# Patient Record
Sex: MALE | Race: WHITE | NOT HISPANIC OR LATINO | Employment: OTHER | URBAN - METROPOLITAN AREA
[De-identification: names, ages, dates, MRNs, and addresses within clinical notes are randomized per-mention and may not be internally consistent; named-entity substitution may affect disease eponyms.]

---

## 2018-07-11 NOTE — PATIENT DISCUSSION
"""Annual Type 1 Diabetic eye exam with dilation. Mild nonproliferative diabetic retinopathy found. Macular edema is not present in the right eye. Recommend annual dilated examinations. Patient instructed to call office immediately if sudden changes in vision occur. Emphasized importance of good blood glucose control. Summary of care provided to the physician managing the ongoing diabetes care."" ""Annual Type 1 Diabetic eye exam with dilation. Mild nonproliferative diabetic retinopathy found. Macular edema is not present in the right eye. Recommend annual dilated examinations. Patient instructed to call office immediately if sudden changes in vision occur. Emphasized importance of good blood glucose control. Summary of care provided to the physician managing the ongoing diabetes care. """

## 2022-01-05 NOTE — PATIENT DISCUSSION
"""Type 2 diabetic eye exam with dilation. Mild diabetic retinopathy found. Macular edema is not present in the right eye. Patient instructed to call office immediately if sudden changes in vision occur. Emphasized importance of good blood glucose control. Return in 6 months for dilated fundus exam. Summary of care provided to the physician managing the ongoing diabetes care. ""."

## 2022-04-14 ENCOUNTER — CONSULTATION/EVALUATION (OUTPATIENT)
Dept: URBAN - METROPOLITAN AREA CLINIC 43 | Facility: CLINIC | Age: 67
End: 2022-04-14

## 2022-04-14 DIAGNOSIS — H25.813: ICD-10-CM

## 2022-04-14 PROCEDURE — 92004 COMPRE OPH EXAM NEW PT 1/>: CPT

## 2022-04-14 PROCEDURE — 92025-3 CORNEAL TOPO, REFUSED

## 2022-04-14 PROCEDURE — 92136TC INTERFEROMETRY - TECHNICAL COMPONENT

## 2022-04-14 ASSESSMENT — VISUAL ACUITY
OS_AM: 20/20-1
OD_RAM: 20/20-1
OD_SC: J4
OD_SC: 20/50+2
OS_SC: J1
OD_BAT: 20/60
OS_BAT: 20/200
OS_SC: CF 4FT

## 2022-04-14 ASSESSMENT — TONOMETRY
OD_IOP_MMHG: 15
OS_IOP_MMHG: 16

## 2022-04-14 NOTE — PATIENT DISCUSSION
Discussed monovision. This helps lessen the dependence on glasses, but is a compromise and glasses are often still needed for some activities including driving and binocular vision. Goal would be -1.75 OS ,Aubrie OD.

## 2022-04-14 NOTE — PATIENT DISCUSSION
PT WISHES TO SCHEDULE IN [facility]. PT LEANING TOWARD OS ONLY CUSTOM NEAR GOAL 1.75, BUT WISHES TO THINK ABOUT IT.

## 2022-04-14 NOTE — PATIENT DISCUSSION
Discussed the difficulty of selecting IOL's in previous refractive surgery patients and discussed the possibility of refractive surprise and possible IOL exchange with right eye.

## 2023-04-06 ENCOUNTER — POST-OP (OUTPATIENT)
Dept: URBAN - METROPOLITAN AREA CLINIC 46 | Facility: CLINIC | Age: 68
End: 2023-04-06

## 2023-04-06 DIAGNOSIS — Z96.1: ICD-10-CM

## 2023-04-06 PROCEDURE — 99024 POSTOP FOLLOW-UP VISIT: CPT

## 2023-04-06 ASSESSMENT — VISUAL ACUITY
OD_SC: J10
OD_SC: 20/20-1
OS_SC: 20/20-1
OS_SC: J10

## 2023-04-06 ASSESSMENT — TONOMETRY
OS_IOP_MMHG: 11
OD_IOP_MMHG: 15

## 2023-10-06 ENCOUNTER — TREATMENT (OUTPATIENT)
Dept: PHYSICAL THERAPY | Facility: CLINIC | Age: 68
End: 2023-10-06
Payer: MEDICARE

## 2023-10-06 DIAGNOSIS — M54.2 PAIN, NECK: ICD-10-CM

## 2023-10-06 PROCEDURE — 97110 THERAPEUTIC EXERCISES: CPT | Mod: GP | Performed by: PHYSICAL THERAPIST

## 2023-10-06 PROCEDURE — 97140 MANUAL THERAPY 1/> REGIONS: CPT | Mod: GP,59 | Performed by: PHYSICAL THERAPIST

## 2023-10-06 PROCEDURE — 97161 PT EVAL LOW COMPLEX 20 MIN: CPT | Mod: GP | Performed by: PHYSICAL THERAPIST

## 2023-10-06 PROCEDURE — 97012 MECHANICAL TRACTION THERAPY: CPT | Mod: GP | Performed by: PHYSICAL THERAPIST

## 2023-10-06 NOTE — PROGRESS NOTES
Physical Therapy    Physical Therapy Cervical Spine Evaluation    Patient Name: Alex Dukes  MRN: [id number]  Today's Date: 10/6/2023  Time Calculation  Start Time: 0235  Stop Time: 0340  Time Calculation (min): 65 min    Current Problem  Problem List Items Addressed This Visit             ICD-10-CM    Pain, neck M54.2    Relevant Orders    Follow Up In Physical Therapy         Precautions  Precautions  STEADI Fall Risk Score (The score of 4 or more indicates an increased risk of falling): 0  Precautions Comment: none       Pain  Pain Assessment: 0-10  Pain Score: 3  Pain Type: Chronic pain  Pain Location: Neck  Pain Orientation: Right, Left  Pain Radiating Towards: RUE in median nerve pathway  Pain Descriptors: Aching, Dull, Headache, Numbness, Tingling, Throbbing    SUBJECTIVE:   Chief complaint:  Chronic cervical pain that has been going on for 17-18 years. Has been noticing radicular pain into Right arm as well as all the way down to his first and second fingers, with worsening over summer with playing golf. Does noticing increased pain with excessive use of RUE, such as with golfing. Does get crepitus in cervical spine. Denies any issues with TMJ;  Doesn't have any history of trauma to next recently; Does sleep on side or back and uses pillow with cervical support. Is trying to get new traction unit as he has one from 20 years ago which does not work anymore. Traction has been most helpful treatment in the past.    Pain Better: massage; cupping; traction    Pain Worse: golfing;     Patients goal:  get traction unit    Prior tx:  has history of cervical traction which has helped.     OBJECTIVE:    Posture: mild forward head;     Upper extremity ROM: WNL Unless documented below:  ROM in Degrees RIGHT LEFT   Shoulder Flexion WNL WNL   Shoulder Abduction WNL WNL   Shoulder ER WNL WNL   Shoulder IR WNL WNL   Elbow Flexion     Elbow Extension         Upper Extremity Strength:   RIGHT LEFT   Shoulder Flexion  4-/5 4+/5    Shoulder Abduction  4+/5 4+/5   Shoulder ER 5/5 5/5   Shoulder IR 5/5 5/5   Elbow Flexion 5/5 5/5   Elbow Extension 5/5 5/5     Cervical ROM:  Date: eval Degrees    Flexion 50    Extension 20 *     RIGHT LEFT   Side bend 30 pinching 55   Rotation 35 tightness 40 tightness     Joint mobility: 1-2/6 Right side cervical  Palpation : MTrP's throughout Right scapular musculature, UT, SO, SCM, Scalenes  Neurological symptoms: radicular symptoms into RUE median nerve distribution  Special tests:  Spurling's (+)R>L    TREATMENT:  Mechanical Traction: 20#, 2 step intermittent hold, 8' total (N)  Manual:  -TDN: 60mm to R UT  -STM to SO, R UT, SCM, and Scalenes  -Joint mobilization: Cervical A-P to Right side grade 2-3; Downglides Right side gr 2-3  -PROM Cervical spine rotation B/L  Ther-ex: Pt edu and review of HEP with NAY given and peach, orange, and green theraband given      Access Code: [email or web address]  URL: [email or web address]  Date: 10/06/2023  Prepared by: Soumya Velasco    Exercises  - Supine Cervical Rotation AROM on Pillow  - 1 x daily - 7 x weekly - 1 sets - 10 reps - 5 hold  - Supine Cervical Sidebending  - 1 x daily - 7 x weekly - 1 sets - 10 reps - 5 hold  - Supine Suboccipital Release with Tennis Balls  - 1 x daily - 7 x weekly - 1 sets - 10 reps  - Seated Scapular Retraction  - 1 x daily - 7 x weekly - 1 sets - 10 reps - 3 hold  - Supine Shoulder External Rotation with Resistance  - 1 x daily - 7 x weekly - 2 sets - 10 reps - 5 hold  - Theracane Over Shoulder  - 1 x daily - 7 x weekly - 1 sets - 10 reps  ASSESSMENT  Pt is a 67 y.o.  referred for physical therapy by Huang Antonio MD for neck pain. Pt presents with decreased cervical AROM, as well as decreased joint mobility throughout cervical spine, with neural involvement appearing to be in median nerve pathway. Pt also has significant myofascial restrictions throughout cervical and scapular musculature, which responded well to STM,  Trigger point dry needling, as well as mechanical traction performed this date. . This patient would benefit from a therapy program to restore prior level of function, decrease pain, increase AROM, increase strength and improve posture.    The physical therapy prognosis is good for the patient to achieve their goals.   The pt tolerated therapy treatment today well with no adverse effects.    PLAN  Cryotherapy; Dry needling; Education/ Instruction; Electrical stimulation; Manual therapy; Mechanical traction; Self care/ home management; Therapeutic activities; Therapeutic exercises; IASTM/CUPPING    Medicare certification period: 10/6/2023-01/6/2024     Goals:  Encounter Problems       Encounter Problems (Active)       PT Problem       STG       Start:  10/08/23    Expected End:  10/20/23       Pt will demo and report compliance with HEP in order to augment POC goals and progression toward independence with symptom management.          LTG's       Start:  10/08/23    Expected End:  11/06/23       Pt will demo improved Cervical AROM by  >/= 5 degrees into all restricted ranges for improved ease with functional activities and progression toward independence with ADL's & IADL's.     Pt will report subjective improvement with c/o pain at worst decreased from 7/10 to </= 3/10 with decreased radicular pain by >/= 50% for improved tolerance and ease with ADL's & IADL's.

## 2023-10-06 NOTE — LETTER
October 8, 2023    Soumya Velasco, PT  [address] [facility] [address]    Patient: Alex Dukes   YOB: 1955   Date of Visit: 10/6/2023       Dear Wagner Carlson Md  [address] [place] [address] [place] [address]    The attached plan of care is being sent to you because your patient’s medical reimbursement requires that you certify the plan of care. Your signature is required to allow uninterrupted insurance coverage.      You may indicate your approval by signing below and faxing this form back to us at Dept Fax: [phone].    Please call Dept: [phone] with any questions or concerns.    Thank you for this referral,        Soumya Velasco, PT  [address] [place]  [facility] [address] [phone]    Payer: Payor: MEDICARE / Plan: MEDICARE PART A AND B / Product Type: *No Product type* /                                                                         Date:     Dear Soumya Velasco PT,     Re: Mr. Alex Dukes, MRN:[id number]    I certify that I have reviewed the attached plan of care and it is medically necessary for Mr. Alex Dukes (1955) who is under my care.          ______________________________________                    _________________  Provider name and credentials                                           Date and time                                                                                           Plan of Care 10/6/23   Effective from: 10/6/2023  Effective to: 11/6/2023    Plan ID: [id number]            Participants as of Finalize on 10/8/2023    Name Type Comments Contact Info    Huang Antonio MD PCP - General  [phone]    Soumya Velasco PT Physical Therapist  [phone]       Last Plan Note     Author: Soumya Velasco PT Status: Signed Last edited: 10/6/2023  2:30 PM       Physical Therapy    Physical Therapy Cervical Spine Evaluation    Patient Name: Alex Dukes  MRN:  [id number]  Today's Date: 10/6/2023  Time Calculation  Start Time: 0235  Stop Time: 0340  Time Calculation (min): 65 min    Current Problem  Problem List Items Addressed This Visit             ICD-10-CM    Pain, neck M54.2    Relevant Orders    Follow Up In Physical Therapy         Precautions  Precautions  STEADI Fall Risk Score (The score of 4 or more indicates an increased risk of falling): 0  Precautions Comment: none       Pain  Pain Assessment: 0-10  Pain Score: 3  Pain Type: Chronic pain  Pain Location: Neck  Pain Orientation: Right, Left  Pain Radiating Towards: RUE in median nerve pathway  Pain Descriptors: Aching, Dull, Headache, Numbness, Tingling, Throbbing    SUBJECTIVE:   Chief complaint:  Chronic cervical pain that has been going on for 17-18 years. Has been noticing radicular pain into Right arm as well as all the way down to his first and second fingers, with worsening over summer with playing golf. Does noticing increased pain with excessive use of RUE, such as with golfing. Does get crepitus in cervical spine. Denies any issues with TMJ;  Doesn't have any history of trauma to next recently; Does sleep on side or back and uses pillow with cervical support. Is trying to get new traction unit as he has one from 20 years ago which does not work anymore. Traction has been most helpful treatment in the past.    Pain Better: massage; cupping; traction    Pain Worse: golfing;     Patients goal:  get traction unit    Prior tx:  has history of cervical traction which has helped.     OBJECTIVE:    Posture: mild forward head;     Upper extremity ROM: WNL Unless documented below:  ROM in Degrees RIGHT LEFT   Shoulder Flexion WNL WNL   Shoulder Abduction WNL WNL   Shoulder ER WNL WNL   Shoulder IR WNL WNL   Elbow Flexion     Elbow Extension         Upper Extremity Strength:   RIGHT LEFT   Shoulder Flexion  4-/5 4+/5   Shoulder Abduction  4+/5 4+/5   Shoulder ER 5/5 5/5   Shoulder IR 5/5 5/5   Elbow Flexion 5/5  5/5   Elbow Extension 5/5 5/5     Cervical ROM:  Date: eval Degrees    Flexion 50    Extension 20 *     RIGHT LEFT   Side bend 30 pinching 55   Rotation 35 tightness 40 tightness     Joint mobility: 1-2/6 Right side cervical  Palpation : MTrP's throughout Right scapular musculature, UT, SO, SCM, Scalenes  Neurological symptoms: radicular symptoms into RUE median nerve distribution  Special tests:  Spurling's (+)R>L    TREATMENT:  Mechanical Traction: 20#, 2 step intermittent hold, 8' total (N)  Manual:  -TDN: 60mm to R UT  -STM to SO, R UT, SCM, and Scalenes  -Joint mobilization: Cervical A-P to Right side grade 2-3; Downglides Right side gr 2-3  -PROM Cervical spine rotation B/L  Ther-ex: Pt edu and review of HEP with NAY huitron and peach, orange, and green theraband given      Access Code: [email or web address]  URL: [email or web address]  Date: 10/06/2023  Prepared by: Soumya Velasco    Exercises  - Supine Cervical Rotation AROM on Pillow  - 1 x daily - 7 x weekly - 1 sets - 10 reps - 5 hold  - Supine Cervical Sidebending  - 1 x daily - 7 x weekly - 1 sets - 10 reps - 5 hold  - Supine Suboccipital Release with Tennis Balls  - 1 x daily - 7 x weekly - 1 sets - 10 reps  - Seated Scapular Retraction  - 1 x daily - 7 x weekly - 1 sets - 10 reps - 3 hold  - Supine Shoulder External Rotation with Resistance  - 1 x daily - 7 x weekly - 2 sets - 10 reps - 5 hold  - Theracane Over Shoulder  - 1 x daily - 7 x weekly - 1 sets - 10 reps  ASSESSMENT  Pt is a 67 y.o.  referred for physical therapy by Huang Antonio MD for neck pain. Pt presents with decreased cervical AROM, as well as decreased joint mobility throughout cervical spine, with neural involvement appearing to be in median nerve pathway. Pt also has significant myofascial restrictions throughout cervical and scapular musculature, which responded well to STM, Trigger point dry needling, as well as mechanical traction performed this date. . This patient  would benefit from a therapy program to restore prior level of function, decrease pain, increase AROM, increase strength and improve posture.    The physical therapy prognosis is good for the patient to achieve their goals.   The pt tolerated therapy treatment today well with no adverse effects.    PLAN  Cryotherapy; Dry needling; Education/ Instruction; Electrical stimulation; Manual therapy; Mechanical traction; Self care/ home management; Therapeutic activities; Therapeutic exercises; IASTM/CUPPING    Medicare certification period: 10/6/2023-01/6/2024     Goals:  Encounter Problems       Encounter Problems (Active)       PT Problem       STG       Start:  10/08/23    Expected End:  10/20/23       Pt will demo and report compliance with HEP in order to augment POC goals and progression toward independence with symptom management.          LTG's       Start:  10/08/23    Expected End:  11/06/23       Pt will demo improved Cervical AROM by  >/= 5 degrees into all restricted ranges for improved ease with functional activities and progression toward independence with ADL's & IADL's.     Pt will report subjective improvement with c/o pain at worst decreased from 7/10 to </= 3/10 with decreased radicular pain by >/= 50% for improved tolerance and ease with ADL's & IADL's.                             Current Participants as of 10/8/2023    Name Type Comments Contact Info    Huang Antonio MD PCP - General  [phone]    Signature pending    Soumya Velasco, PT Physical Therapist  [phone]    Signature pending

## 2023-10-06 NOTE — LETTER
October 8, 2023    Huang Antonio MD  [address] [facility] [address]    Patient: Alex Dukes   YOB: 1955   Date of Visit: 10/6/2023       Dear Wagner Carlson Md  [facility] [address] [place] [address]    The attached plan of care is being sent to you because your patient’s medical reimbursement requires that you certify the plan of care. Your signature is required to allow uninterrupted insurance coverage.      You may indicate your approval by signing below and faxing this form back to us at Dept Fax: [phone].    Please call Dept: [phone] with any questions or concerns.    Thank you for this referral,        Soumya Velasco, PT  [address] [place]  [facility] [address] [phone]    Payer: Payor: MEDICARE / Plan: MEDICARE PART A AND B / Product Type: *No Product type* /                                                                         Date:     Dear Soumya Velasco PT,     Re: Mr. Alex Dukes, MRN:[id number]    I certify that I have reviewed the attached plan of care and it is medically necessary for Mr. Alex Dukes (1955) who is under my care.          ______________________________________                    _________________  Provider name and credentials                                           Date and time                                                                                           Plan of Care 10/6/23   Effective from: 10/6/2023  Effective to: 11/6/2023    Plan ID: [id number]            Participants as of Finalize on 10/8/2023    Name Type Comments Contact Info    Huang Antonio MD PCP - General  [phone]    Soumya Velasco PT Physical Therapist  [phone]       Last Plan Note     Author: Soumya Velasco PT Status: Signed Last edited: 10/6/2023  2:30 PM       Physical Therapy    Physical Therapy Cervical Spine Evaluation    Patient Name: Alex Dukes  MRN: [id number]  Today's  Date: 10/6/2023  Time Calculation  Start Time: 0235  Stop Time: 0340  Time Calculation (min): 65 min    Current Problem  Problem List Items Addressed This Visit             ICD-10-CM    Pain, neck M54.2    Relevant Orders    Follow Up In Physical Therapy         Precautions  Precautions  STEADI Fall Risk Score (The score of 4 or more indicates an increased risk of falling): 0  Precautions Comment: none       Pain  Pain Assessment: 0-10  Pain Score: 3  Pain Type: Chronic pain  Pain Location: Neck  Pain Orientation: Right, Left  Pain Radiating Towards: RUE in median nerve pathway  Pain Descriptors: Aching, Dull, Headache, Numbness, Tingling, Throbbing    SUBJECTIVE:   Chief complaint:  Chronic cervical pain that has been going on for 17-18 years. Has been noticing radicular pain into Right arm as well as all the way down to his first and second fingers, with worsening over summer with playing golf. Does noticing increased pain with excessive use of RUE, such as with golfing. Does get crepitus in cervical spine. Denies any issues with TMJ;  Doesn't have any history of trauma to next recently; Does sleep on side or back and uses pillow with cervical support. Is trying to get new traction unit as he has one from 20 years ago which does not work anymore. Traction has been most helpful treatment in the past.    Pain Better: massage; cupping; traction    Pain Worse: golfing;     Patients goal:  get traction unit    Prior tx:  has history of cervical traction which has helped.     OBJECTIVE:    Posture: mild forward head;     Upper extremity ROM: WNL Unless documented below:  ROM in Degrees RIGHT LEFT   Shoulder Flexion WNL WNL   Shoulder Abduction WNL WNL   Shoulder ER WNL WNL   Shoulder IR WNL WNL   Elbow Flexion     Elbow Extension         Upper Extremity Strength:   RIGHT LEFT   Shoulder Flexion  4-/5 4+/5   Shoulder Abduction  4+/5 4+/5   Shoulder ER 5/5 5/5   Shoulder IR 5/5 5/5   Elbow Flexion 5/5 5/5   Elbow  Extension 5/5 5/5     Cervical ROM:  Date: eval Degrees    Flexion 50    Extension 20 *     RIGHT LEFT   Side bend 30 pinching 55   Rotation 35 tightness 40 tightness     Joint mobility: 1-2/6 Right side cervical  Palpation : MTrP's throughout Right scapular musculature, UT, SO, SCM, Scalenes  Neurological symptoms: radicular symptoms into RUE median nerve distribution  Special tests:  Spurling's (+)R>L    TREATMENT:  Mechanical Traction: 20#, 2 step intermittent hold, 8' total (N)  Manual:  -TDN: 60mm to R UT  -STM to SO, R UT, SCM, and Scalenes  -Joint mobilization: Cervical A-P to Right side grade 2-3; Downglides Right side gr 2-3  -PROM Cervical spine rotation B/L  Ther-ex: Pt edu and review of HEP with NAY huitron and peach, orange, and green theraband given      Access Code: [email or web address]  URL: [email or web address]  Date: 10/06/2023  Prepared by: Soumya Velasco    Exercises  - Supine Cervical Rotation AROM on Pillow  - 1 x daily - 7 x weekly - 1 sets - 10 reps - 5 hold  - Supine Cervical Sidebending  - 1 x daily - 7 x weekly - 1 sets - 10 reps - 5 hold  - Supine Suboccipital Release with Tennis Balls  - 1 x daily - 7 x weekly - 1 sets - 10 reps  - Seated Scapular Retraction  - 1 x daily - 7 x weekly - 1 sets - 10 reps - 3 hold  - Supine Shoulder External Rotation with Resistance  - 1 x daily - 7 x weekly - 2 sets - 10 reps - 5 hold  - Theracane Over Shoulder  - 1 x daily - 7 x weekly - 1 sets - 10 reps  ASSESSMENT  Pt is a 67 y.o.  referred for physical therapy by Huang Antonio MD for neck pain. Pt presents with decreased cervical AROM, as well as decreased joint mobility throughout cervical spine, with neural involvement appearing to be in median nerve pathway. Pt also has significant myofascial restrictions throughout cervical and scapular musculature, which responded well to STM, Trigger point dry needling, as well as mechanical traction performed this date. . This patient would benefit  from a therapy program to restore prior level of function, decrease pain, increase AROM, increase strength and improve posture.    The physical therapy prognosis is good for the patient to achieve their goals.   The pt tolerated therapy treatment today well with no adverse effects.    PLAN  Cryotherapy; Dry needling; Education/ Instruction; Electrical stimulation; Manual therapy; Mechanical traction; Self care/ home management; Therapeutic activities; Therapeutic exercises; IASTM/CUPPING    Medicare certification period: 10/6/2023-01/6/2024     Goals:  Encounter Problems       Encounter Problems (Active)       PT Problem       STG       Start:  10/08/23    Expected End:  10/20/23       Pt will demo and report compliance with HEP in order to augment POC goals and progression toward independence with symptom management.          LTG's       Start:  10/08/23    Expected End:  11/06/23       Pt will demo improved Cervical AROM by  >/= 5 degrees into all restricted ranges for improved ease with functional activities and progression toward independence with ADL's & IADL's.     Pt will report subjective improvement with c/o pain at worst decreased from 7/10 to </= 3/10 with decreased radicular pain by >/= 50% for improved tolerance and ease with ADL's & IADL's.                             Current Participants as of 10/8/2023    Name Type Comments Contact Info    Huang Antonio MD PCP - General  [phone]    Signature pending    Soumya Velasco, PT Physical Therapist  [phone]    Signature pending

## 2023-10-08 PROBLEM — M54.2 PAIN, NECK: Status: ACTIVE | Noted: 2023-10-08

## 2023-10-08 ASSESSMENT — ENCOUNTER SYMPTOMS
OCCASIONAL FEELINGS OF UNSTEADINESS: 0
DEPRESSION: 0
LOSS OF SENSATION IN FEET: 0

## 2023-10-08 ASSESSMENT — PAIN - FUNCTIONAL ASSESSMENT: PAIN_FUNCTIONAL_ASSESSMENT: 0-10

## 2023-10-08 ASSESSMENT — PAIN SCALES - GENERAL: PAINLEVEL_OUTOF10: 3

## 2023-10-11 ENCOUNTER — TREATMENT (OUTPATIENT)
Dept: PHYSICAL THERAPY | Facility: CLINIC | Age: 68
End: 2023-10-11
Payer: MEDICARE

## 2023-10-11 DIAGNOSIS — M54.2 PAIN, NECK: ICD-10-CM

## 2023-10-11 PROCEDURE — 97012 MECHANICAL TRACTION THERAPY: CPT | Mod: GP | Performed by: PHYSICAL THERAPIST

## 2023-10-11 PROCEDURE — 97110 THERAPEUTIC EXERCISES: CPT | Mod: GP | Performed by: PHYSICAL THERAPIST

## 2023-10-11 PROCEDURE — 97140 MANUAL THERAPY 1/> REGIONS: CPT | Mod: GP | Performed by: PHYSICAL THERAPIST

## 2023-10-11 ASSESSMENT — ENCOUNTER SYMPTOMS
DEPRESSION: 0
OCCASIONAL FEELINGS OF UNSTEADINESS: 0
LOSS OF SENSATION IN FEET: 0

## 2023-10-11 ASSESSMENT — PAIN - FUNCTIONAL ASSESSMENT: PAIN_FUNCTIONAL_ASSESSMENT: 0-10

## 2023-10-11 ASSESSMENT — PAIN SCALES - GENERAL: PAINLEVEL_OUTOF10: 5 - MODERATE PAIN

## 2023-10-11 NOTE — PROGRESS NOTES
"Physical Therapy Treatment    Patient Name: Alex Dukes  MRN: [id number]  Today's Date: 10/11/2023         Assessment:   Pt tolerated continued trigger point dry needling this date well without any adverse reactions. Pt with decreased cervical joint mobility and PROM into rotation this date, but improved after manual techniques. Educated patient on home traction device and answered patients questions on operated home device he will receive.  Tolerated addition of 1/2 foam roll stretches and added to HEP.     Plan:   Plan to continue with focus on manual techniques, including TDN as well as progression of cervical and scapular stabilization exercises to prolong effects of manual techniques.     Current Problem  1. Pain, neck  Follow Up In Physical Therapy          Subjective   General  General Comment: Notes he felt good after last session, was mildly sore, but by the next day and over the weekend. Did sleep on a harder surface last night and can tell he is stiffer today. Denies any radicular symptoms.  Precautions  Precautions  STEADI Fall Risk Score (The score of 4 or more indicates an increased risk of falling): 0  Precautions Comment: none    Pain  Pain Assessment: 0-10  Pain Score: 5 - Moderate pain    Treatments:  Modalities  Modalities Used: Yes  Modality 1: Untimed Mechanical Traction 20#, 2 step intermittent hold, 10' total (P time)  Manual:  -TDN: 60mm to R UT; 60mm to L UT (N); 40mm to B/L LS (N)  -STM to SO, R UT, SCM, and Scalenes (X)  -IASTM to R SO, SCM, and scalenes (N)  -Joint mobilization: Cervical A-P to Right side grade 2-3; Downglides Right side gr 2-3  -PROM Cervical spine rotation B/L  Ther-ex:   Supine AROM rotation B/L 5x5\" holds ea (N)  1/2 foam roll: (N)  -pec stretch 3x15\" holds  -snow angels x 10  -hugs 10x3\" holds  -scissors x10    Pt edu and review of updated HEP with HO given   peach, orange, and green theraband given at eval (X)     OP EDUCATION:     Access Code: [email or web address]  URL: " [email or web address]  Date: 10/11/2023  Prepared by: Soumya Trosterud    Exercises  - Supine Chest Stretch on Foam Roll  - 1 x daily - 7 x weekly - 1 sets - 3 reps - 15 hold  - [place] on Foam Roll  - 1 x daily - 7 x weekly - 1 sets - 10 reps - 5 hold  - Thoracic Foam Roll Mobilization Backstroke  - 1 x daily - 7 x weekly - 1 sets - 10 reps - 5 hold  - Thoracic Foam Roll Mobilization Hug  - 1 x daily - 7 x weekly - 1 sets - 10 reps - 5 hold    Access Code: [email or web address]  URL: [email or web address]  Date: 10/06/2023  Prepared by: Soumya Trostertonya     Exercises  - Supine Cervical Rotation AROM on Pillow  - 1 x daily - 7 x weekly - 1 sets - 10 reps - 5 hold  - Supine Cervical Sidebending  - 1 x daily - 7 x weekly - 1 sets - 10 reps - 5 hold  - Supine Suboccipital Release with Tennis Balls  - 1 x daily - 7 x weekly - 1 sets - 10 reps  - Seated Scapular Retraction  - 1 x daily - 7 x weekly - 1 sets - 10 reps - 3 hold  - Supine Shoulder External Rotation with Resistance  - 1 x daily - 7 x weekly - 2 sets - 10 reps - 5 hold  - Theracane Over Shoulder  - 1 x daily - 7 x weekly - 1 sets - 10 reps  Goals:  Encounter Problems       Encounter Problems (Active)       PT Problem       PT Goal 1       Start:  10/11/23    Expected End:  10/25/23           STG          Start:  10/08/23    Expected End:  10/20/23       Pt will demo and report compliance with HEP in order to augment POC goals and progression toward independence with symptom management.                 PT Goal 2       Start:  10/11/23    Expected End:  11/10/23                    LTG's          Start:  10/08/23    Expected End:  11/06/23       Pt will demo improved Cervical AROM by  >/= 5 degrees into all restricted ranges for improved ease with functional activities and progression toward independence with ADL's & IADL's.      Pt will report subjective improvement with c/o pain at worst decreased from 7/10 to </= 3/10 with  decreased radicular pain by >/= 50% for improved tolerance and ease with ADL's & IADL's.

## 2023-10-17 ENCOUNTER — APPOINTMENT (OUTPATIENT)
Dept: PHYSICAL THERAPY | Facility: CLINIC | Age: 68
End: 2023-10-17
Payer: MEDICARE

## 2023-11-10 ENCOUNTER — DOCUMENTATION (OUTPATIENT)
Dept: PHYSICAL THERAPY | Facility: CLINIC | Age: 68
End: 2023-11-10
Payer: MEDICARE

## 2023-11-10 NOTE — PROGRESS NOTES
Physical Therapy                 Therapy Communication Note    Patient Name: Alex Dukes  MRN: [id number]  Today's Date: 11/10/2023     Discipline: Physical Therapy    Comment: Pt being formally discharged from skilled PT at this time. Pt did not attend his final PT visit for any additional re-assessment.

## 2024-03-25 ENCOUNTER — COMPREHENSIVE EXAM (OUTPATIENT)
Dept: URBAN - METROPOLITAN AREA CLINIC 46 | Facility: CLINIC | Age: 69
End: 2024-03-25

## 2024-03-25 DIAGNOSIS — H26.492: ICD-10-CM

## 2024-03-25 DIAGNOSIS — H43.21: ICD-10-CM

## 2024-03-25 DIAGNOSIS — H52.7: ICD-10-CM

## 2024-03-25 DIAGNOSIS — H04.123: ICD-10-CM

## 2024-03-25 DIAGNOSIS — H25.811: ICD-10-CM

## 2024-03-25 PROCEDURE — 92014 COMPRE OPH EXAM EST PT 1/>: CPT

## 2024-03-25 PROCEDURE — 92015 DETERMINE REFRACTIVE STATE: CPT

## 2024-03-25 ASSESSMENT — VISUAL ACUITY
OS_SC: 20/20-2
OD_PH: 20/25
OD_SC: J2
OD_SC: 20/70-1
OS_SC: J5
OS_BAT: 20/50

## 2024-03-25 ASSESSMENT — TONOMETRY
OD_IOP_MMHG: 14
OS_IOP_MMHG: 12

## 2024-07-24 ENCOUNTER — HOSPITAL ENCOUNTER (EMERGENCY)
Facility: HOSPITAL | Age: 69
Discharge: HOME | End: 2024-07-24
Attending: EMERGENCY MEDICINE
Payer: MEDICARE

## 2024-07-24 ENCOUNTER — APPOINTMENT (OUTPATIENT)
Dept: CARDIOLOGY | Facility: HOSPITAL | Age: 69
End: 2024-07-24
Payer: MEDICARE

## 2024-07-24 ENCOUNTER — APPOINTMENT (OUTPATIENT)
Dept: RADIOLOGY | Facility: HOSPITAL | Age: 69
End: 2024-07-24
Payer: MEDICARE

## 2024-07-24 VITALS
BODY MASS INDEX: 27.43 KG/M2 | WEIGHT: 181 LBS | SYSTOLIC BLOOD PRESSURE: 125 MMHG | HEART RATE: 51 BPM | TEMPERATURE: 97.6 F | HEIGHT: 68 IN | DIASTOLIC BLOOD PRESSURE: 84 MMHG | RESPIRATION RATE: 20 BRPM | OXYGEN SATURATION: 98 %

## 2024-07-24 DIAGNOSIS — R07.9 CHEST PAIN, UNSPECIFIED TYPE: Primary | ICD-10-CM

## 2024-07-24 DIAGNOSIS — R00.1 BRADYCARDIA: ICD-10-CM

## 2024-07-24 LAB
ALBUMIN SERPL BCP-MCNC: 3.9 G/DL (ref 3.4–5)
ALP SERPL-CCNC: 87 U/L (ref 33–136)
ALT SERPL W P-5'-P-CCNC: 27 U/L (ref 10–52)
ANION GAP SERPL CALC-SCNC: 10 MMOL/L (ref 10–20)
AST SERPL W P-5'-P-CCNC: 22 U/L (ref 9–39)
BASOPHILS # BLD AUTO: 0.02 X10*3/UL (ref 0–0.1)
BASOPHILS NFR BLD AUTO: 0.2 %
BILIRUB SERPL-MCNC: 0.6 MG/DL (ref 0–1.2)
BUN SERPL-MCNC: 21 MG/DL (ref 6–23)
CALCIUM SERPL-MCNC: 8.5 MG/DL (ref 8.6–10.3)
CARDIAC TROPONIN I PNL SERPL HS: 7 NG/L (ref 0–20)
CARDIAC TROPONIN I PNL SERPL HS: 7 NG/L (ref 0–20)
CHLORIDE SERPL-SCNC: 105 MMOL/L (ref 98–107)
CO2 SERPL-SCNC: 24 MMOL/L (ref 21–32)
CREAT SERPL-MCNC: 0.96 MG/DL (ref 0.5–1.3)
D DIMER PPP FEU-MCNC: 658 NG/ML FEU
EGFRCR SERPLBLD CKD-EPI 2021: 86 ML/MIN/1.73M*2
EOSINOPHIL # BLD AUTO: 0.14 X10*3/UL (ref 0–0.7)
EOSINOPHIL NFR BLD AUTO: 1.7 %
ERYTHROCYTE [DISTWIDTH] IN BLOOD BY AUTOMATED COUNT: 12.4 % (ref 11.5–14.5)
GLUCOSE SERPL-MCNC: 153 MG/DL (ref 74–99)
HCT VFR BLD AUTO: 44.4 % (ref 41–52)
HGB BLD-MCNC: 15.4 G/DL (ref 13.5–17.5)
IMM GRANULOCYTES # BLD AUTO: 0.03 X10*3/UL (ref 0–0.7)
IMM GRANULOCYTES NFR BLD AUTO: 0.4 % (ref 0–0.9)
LYMPHOCYTES # BLD AUTO: 1.4 X10*3/UL (ref 1.2–4.8)
LYMPHOCYTES NFR BLD AUTO: 17.4 %
MAGNESIUM SERPL-MCNC: 1.95 MG/DL (ref 1.6–2.4)
MCH RBC QN AUTO: 31.6 PG (ref 26–34)
MCHC RBC AUTO-ENTMCNC: 34.7 G/DL (ref 32–36)
MCV RBC AUTO: 91 FL (ref 80–100)
MONOCYTES # BLD AUTO: 0.39 X10*3/UL (ref 0.1–1)
MONOCYTES NFR BLD AUTO: 4.8 %
NEUTROPHILS # BLD AUTO: 6.08 X10*3/UL (ref 1.2–7.7)
NEUTROPHILS NFR BLD AUTO: 75.5 %
NRBC BLD-RTO: 0 /100 WBCS (ref 0–0)
PLATELET # BLD AUTO: 192 X10*3/UL (ref 150–450)
POTASSIUM SERPL-SCNC: 4 MMOL/L (ref 3.5–5.3)
PROT SERPL-MCNC: 6.5 G/DL (ref 6.4–8.2)
RBC # BLD AUTO: 4.88 X10*6/UL (ref 4.5–5.9)
SODIUM SERPL-SCNC: 135 MMOL/L (ref 136–145)
WBC # BLD AUTO: 8.1 X10*3/UL (ref 4.4–11.3)

## 2024-07-24 PROCEDURE — 84075 ASSAY ALKALINE PHOSPHATASE: CPT | Performed by: EMERGENCY MEDICINE

## 2024-07-24 PROCEDURE — 85379 FIBRIN DEGRADATION QUANT: CPT | Performed by: EMERGENCY MEDICINE

## 2024-07-24 PROCEDURE — 93005 ELECTROCARDIOGRAM TRACING: CPT

## 2024-07-24 PROCEDURE — 84484 ASSAY OF TROPONIN QUANT: CPT | Performed by: EMERGENCY MEDICINE

## 2024-07-24 PROCEDURE — 83735 ASSAY OF MAGNESIUM: CPT | Performed by: EMERGENCY MEDICINE

## 2024-07-24 PROCEDURE — 71275 CT ANGIOGRAPHY CHEST: CPT | Performed by: RADIOLOGY

## 2024-07-24 PROCEDURE — 36415 COLL VENOUS BLD VENIPUNCTURE: CPT | Performed by: EMERGENCY MEDICINE

## 2024-07-24 PROCEDURE — 2550000001 HC RX 255 CONTRASTS: Performed by: EMERGENCY MEDICINE

## 2024-07-24 PROCEDURE — 85025 COMPLETE CBC W/AUTO DIFF WBC: CPT | Performed by: EMERGENCY MEDICINE

## 2024-07-24 PROCEDURE — 71045 X-RAY EXAM CHEST 1 VIEW: CPT | Performed by: STUDENT IN AN ORGANIZED HEALTH CARE EDUCATION/TRAINING PROGRAM

## 2024-07-24 PROCEDURE — 99285 EMERGENCY DEPT VISIT HI MDM: CPT

## 2024-07-24 PROCEDURE — 71045 X-RAY EXAM CHEST 1 VIEW: CPT

## 2024-07-24 PROCEDURE — 71275 CT ANGIOGRAPHY CHEST: CPT

## 2024-07-24 RX ORDER — LISINOPRIL 40 MG/1
40 TABLET ORAL DAILY
COMMUNITY

## 2024-07-24 RX ORDER — ACETAMINOPHEN 500 MG
500 TABLET ORAL EVERY 6 HOURS PRN
COMMUNITY

## 2024-07-24 RX ORDER — ATORVASTATIN CALCIUM 40 MG/1
40 TABLET, FILM COATED ORAL DAILY
COMMUNITY

## 2024-07-24 RX ORDER — MELOXICAM 15 MG/1
15 TABLET ORAL DAILY
COMMUNITY

## 2024-07-24 RX ORDER — ASPIRIN 81 MG/1
81 TABLET ORAL DAILY
COMMUNITY

## 2024-07-24 RX ORDER — SILDENAFIL 50 MG/1
50 TABLET, FILM COATED ORAL DAILY PRN
COMMUNITY

## 2024-07-24 ASSESSMENT — HEART SCORE
AGE: 65+
HEART SCORE: 3
RISK FACTORS: 1-2 RISK FACTORS
ECG: NORMAL
TROPONIN: LESS THAN OR EQUAL TO NORMAL LIMIT
HISTORY: SLIGHTLY SUSPICIOUS

## 2024-07-24 ASSESSMENT — COLUMBIA-SUICIDE SEVERITY RATING SCALE - C-SSRS
1. IN THE PAST MONTH, HAVE YOU WISHED YOU WERE DEAD OR WISHED YOU COULD GO TO SLEEP AND NOT WAKE UP?: NO
6. HAVE YOU EVER DONE ANYTHING, STARTED TO DO ANYTHING, OR PREPARED TO DO ANYTHING TO END YOUR LIFE?: NO
2. HAVE YOU ACTUALLY HAD ANY THOUGHTS OF KILLING YOURSELF?: NO

## 2024-07-24 ASSESSMENT — PAIN - FUNCTIONAL ASSESSMENT: PAIN_FUNCTIONAL_ASSESSMENT: 0-10

## 2024-07-24 ASSESSMENT — PAIN SCALES - GENERAL
PAINLEVEL_OUTOF10: 0 - NO PAIN
PAINLEVEL_OUTOF10: 0 - NO PAIN

## 2024-07-24 NOTE — ED PROVIDER NOTES
HPI   Chief Complaint   Patient presents with    Chest Pain     Patient to ED reference non radiating mid sternal chest pain x 2 days. It startes at night and is dull in nature. Negative any difficulty breathing, nausea or vomiting. He recently returned home from travel via auto.       Limitations to History: None    HPI: 68-year-old male presents with concern for chest pain.  Intermittent in nature over the past 4 days.  Dull.  Denies any fever, chills, cough, nausea, vomiting, shortness of breath, abdominal pain.  Did just returned from [place] 5 days ago.  Denies any fall or trauma.  No history of coronary artery disease.    ------------------------------------------------------------------------------------------------------------------------------------------  Physical Exam:    VS: As documented in the triage note and EMR flowsheet from this visit were reviewed.    Appearance: Alert. cooperative,  in no acute distress.   Skin: Intact,  dry skin, no lesions, rash, petechiae or purpura.   Eyes: PERRLA, EOMs intact,  Conjunctiva pink with no redness or exudates.   HENT: Normocephalic, atraumatic. Nares patent. No intraoral lesions.   Neck: Supple, without meningismus. Trachea at midline. No lymphadenopathy.  Pulmonary: Clear bilaterally with good chest wall excursion. No rales, rhonchi or wheezing. No accessory muscle use or stridor.  Cardiac: Regular rate and rhythm, no rubs, murmurs, or gallops.   Abdomen: Abdomen is soft, nontender, and nondistended.  No palpable organomegaly.  No rebound or guarding.  No CVA tenderness. Nonsurgical abdomen.  Genitourinary: Exam deferred.  Musculoskeletal: Full range of motion.  Pulses full and equal. No cyanosis, clubbing, or edema.  Neurological:  Cranial nerves are grossly intact, grossly normal sensation, no weakness, no focal findings identified.  Psychiatric: Appropriate mood and affect.                Patient History   No past medical history on file.  No past surgical  history on file.  No family history on file.  Social History     Tobacco Use    Smoking status: Not on file    Smokeless tobacco: Not on file   Substance Use Topics    Alcohol use: Not on file    Drug use: Not on file       Physical Exam   ED Triage Vitals   Temp Pulse Resp BP   -- -- -- --      SpO2 Temp src Heart Rate Source Patient Position   -- -- -- --      BP Location FiO2 (%)     -- --       Physical Exam      ED Course & MDM   Diagnoses as of 07/24/24 1237   Chest pain, unspecified type   Bradycardia                       No data recorded                      Medical Decision Making  Labs Reviewed  COMPREHENSIVE METABOLIC PANEL - Abnormal     Glucose                       153 (*)                Sodium                        135 (*)                Potassium                     4.0                    Chloride                      105                    Bicarbonate                   24                     Anion Gap                     10                     Urea Nitrogen                 21                     Creatinine                    0.96                   eGFR                          86                     Calcium                       8.5 (*)                Albumin                       3.9                    Alkaline Phosphatase          87                     Total Protein                 6.5                    AST                           22                     Bilirubin, Total              0.6                    ALT                           27                  D-DIMER, VTE EXCLUSION - Abnormal     D-Dimer, Quantitative VTE Exclusion   658 (*)                    Narrative: The VTE Exclusion D-Dimer assay is reported in ng/mL Fibrinogen Equivalent Units (FEU).                                    Per 's instructions for use, a value of less than 500 ng/mL (FEU) may help to exclude DVT or PE in outpatients when the assay is used with a clinical pretest probability assessment.([facility] must  utilize and document eCalc 'Wells Score Deep Vein Thrombosis Risk' for DVT exclusion only. Emergency Department should utilize  Guidelines for Emergency Department Use of the VTE Exclusion D-Dimer and Clinical Pretest probability assessment model for DVT or PE exclusion.)  MAGNESIUM - Normal     Magnesium                     1.95                SERIAL TROPONIN-INITIAL - Normal     Troponin I, High Sensitivity   7                          Narrative: Less than 99th percentile of normal range cutoff-                  Female and children under 18 years old <14 ng/L; Male <21 ng/L: Negative                  Repeat testing should be performed if clinically indicated.                                     Female and children under 18 years old 14-50 ng/L; Male 21-50 ng/L:                  Consistent with possible cardiac damage and possible increased clinical                   risk. Serial measurements may help to assess extent of myocardial damage.                                     >50 ng/L: Consistent with cardiac damage, increased clinical risk and                  myocardial infarction. Serial measurements may help assess extent of                   myocardial damage.                                      NOTE: Children less than 1 year old may have higher baseline troponin                   levels and results should be interpreted in conjunction with the overall                   clinical context.                                     NOTE: Troponin I testing is performed using a different                   testing methodology at [facility] than at other                   [facility]. Direct result comparisons should only                   be made within the same method.  SERIAL TROPONIN, 1 HOUR - Normal     Troponin I, High Sensitivity   7                          Narrative: Less than 99th percentile of normal range cutoff-                  Female and children under 18 years old <14 ng/L; Male <21  ng/L: Negative                  Repeat testing should be performed if clinically indicated.                                     Female and children under 18 years old 14-50 ng/L; Male 21-50 ng/L:                  Consistent with possible cardiac damage and possible increased clinical                   risk. Serial measurements may help to assess extent of myocardial damage.                                     >50 ng/L: Consistent with cardiac damage, increased clinical risk and                  myocardial infarction. Serial measurements may help assess extent of                   myocardial damage.                                      NOTE: Children less than 1 year old may have higher baseline troponin                   levels and results should be interpreted in conjunction with the overall                   clinical context.                                     NOTE: Troponin I testing is performed using a different                   testing methodology at [facility]] than at other                   [facility]. Direct result comparisons should only                   be made within the same method.  TROPONIN SERIES- (INITIAL, 1 HR)         Narrative: The following orders were created for panel order Troponin I Series, High Sensitivity (0, 1 HR).                  Procedure                               Abnormality         Status                                     ---------                               -----------         ------                                     Troponin I, High Sensiti...[[id number]]  Normal              Final result                               Troponin, High Sensitivi...[[id number]]  Normal              Final result                                                 Please view results for these tests on the individual orders.  CBC WITH AUTO DIFFERENTIAL  CT angio chest for pulmonary embolism   Final Result    Unremarkable CT scan of the chest. No pulmonary embolism           MACRO:    None          Signed by: Haleybill Berkowitz 7/24/2024 12:07 PM    Dictation workstation:   [id number]     XR chest 1 view   Final Result    1.  No evidence of acute cardiopulmonary process.                Signed by: Trell Colon 7/24/2024 9:49 AM    Dictation workstation:   [id number]     Medical Decision Making:    Patient appears well nontoxic.  EKG nonischemic.  Bradycardia.  Lab work unremarkable including 2 negative high-sensitivity troponins.  CTA performed without pulmonary embolism.  Repeat EKG continues to show no evidence of acute ischemia.  After discussion with patient he will be discharged to follow-up with primary care and cardiology.  Heart score of 3.  Asked return for new or worsening symptoms.  Patient agreeable and discharged home in stable condition.    Differential Diagnoses Considered: ACS, pneumonia, pneumothorax, pulmonary embolism, musculoskeletal chest pain, electrolyte abnormality, volume depletion    Independent Interpretation of Studies:  I independently interpreted: Chest x-ray shows no evidence of pneumonia or pneumothorax.  CTA chest without central pulmonary embolism.    Escalation of Care:  Appropriate for discharge and follow-up with primary care and cardiology.            Procedure  ECG 12 lead    Performed by: Evan Kong DO  Authorized by: Evan Kong DO    ECG interpreted by ED Physician in the absence of a cardiologist: yes    Comments:      EKG interpreted by Dr. Evan Kong: Sinus bradycardia 56 bpm.  WV interval 188 ms.  QTc of 401 ms.  PACs.  ECG 12 lead    Performed by: Evan Kong DO  Authorized by: Evan Kong DO    ECG interpreted by ED Physician in the absence of a cardiologist: yes    Comments:      Repeat EKG performed at 1038 and interpreted by Dr. Evan Kong at 1040: Sinus bradycardia 50 bpm.  First-degree AV block with WV interval 212 ms.  QTc of 382 ms.  PACs.       Evan Kong DO  07/24/24  1230

## 2024-07-25 LAB
ATRIAL RATE: 50 BPM
ATRIAL RATE: 59 BPM
P AXIS: 4 DEGREES
P AXIS: 54 DEGREES
P OFFSET: 164 MS
P OFFSET: 175 MS
P ONSET: 105 MS
P ONSET: 116 MS
PR INTERVAL: 188 MS
PR INTERVAL: 212 MS
Q ONSET: 210 MS
Q ONSET: 211 MS
QRS COUNT: 8 BEATS
QRS COUNT: 9 BEATS
QRS DURATION: 100 MS
QRS DURATION: 100 MS
QT INTERVAL: 406 MS
QT INTERVAL: 420 MS
QTC CALCULATION(BAZETT): 382 MS
QTC CALCULATION(BAZETT): 401 MS
QTC FREDERICIA: 395 MS
QTC FREDERICIA: 403 MS
R AXIS: -52 DEGREES
R AXIS: -69 DEGREES
T AXIS: 27 DEGREES
T AXIS: 7 DEGREES
T OFFSET: 413 MS
T OFFSET: 421 MS
VENTRICULAR RATE: 50 BPM
VENTRICULAR RATE: 59 BPM

## 2025-07-14 ENCOUNTER — APPOINTMENT (OUTPATIENT)
Dept: RADIOLOGY | Facility: HOSPITAL | Age: 70
End: 2025-07-14
Payer: MEDICARE

## 2025-07-14 ENCOUNTER — HOSPITAL ENCOUNTER (EMERGENCY)
Facility: HOSPITAL | Age: 70
Discharge: HOME | End: 2025-07-14
Attending: EMERGENCY MEDICINE
Payer: MEDICARE

## 2025-07-14 VITALS
RESPIRATION RATE: 16 BRPM | DIASTOLIC BLOOD PRESSURE: 75 MMHG | HEIGHT: 68 IN | OXYGEN SATURATION: 96 % | SYSTOLIC BLOOD PRESSURE: 131 MMHG | BODY MASS INDEX: 27.13 KG/M2 | TEMPERATURE: 97.9 F | HEART RATE: 67 BPM | WEIGHT: 179 LBS
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
## 2025-07-14 LAB
ANION GAP SERPL CALC-SCNC: 11 MMOL/L (ref 10–20)
BASOPHILS # BLD AUTO: 0.03 X10*3/UL (ref 0–0.1)
BASOPHILS NFR BLD AUTO: 0.4 %
BUN SERPL-MCNC: 19 MG/DL (ref 6–23)
CALCIUM SERPL-MCNC: 8.9 MG/DL (ref 8.6–10.3)
CHLORIDE SERPL-SCNC: 104 MMOL/L (ref 98–107)
CO2 SERPL-SCNC: 26 MMOL/L (ref 21–32)
CREAT SERPL-MCNC: 0.93 MG/DL (ref 0.5–1.3)
EGFRCR SERPLBLD CKD-EPI 2021: 88 ML/MIN/1.73M*2
EOSINOPHIL # BLD AUTO: 0.42 X10*3/UL (ref 0–0.7)
EOSINOPHIL NFR BLD AUTO: 5 %
ERYTHROCYTE [DISTWIDTH] IN BLOOD BY AUTOMATED COUNT: 12.8 % (ref 11.5–14.5)
GLUCOSE SERPL-MCNC: 147 MG/DL (ref 74–99)
HCT VFR BLD AUTO: 44.1 % (ref 41–52)
HGB BLD-MCNC: 15.8 G/DL (ref 13.5–17.5)
IMM GRANULOCYTES # BLD AUTO: 0.02 X10*3/UL (ref 0–0.7)
IMM GRANULOCYTES NFR BLD AUTO: 0.2 % (ref 0–0.9)
LYMPHOCYTES # BLD AUTO: 1.84 X10*3/UL (ref 1.2–4.8)
LYMPHOCYTES NFR BLD AUTO: 21.9 %
MCH RBC QN AUTO: 31.8 PG (ref 26–34)
MCHC RBC AUTO-ENTMCNC: 35.8 G/DL (ref 32–36)
MCV RBC AUTO: 89 FL (ref 80–100)
MONOCYTES # BLD AUTO: 0.5 X10*3/UL (ref 0.1–1)
MONOCYTES NFR BLD AUTO: 6 %
NEUTROPHILS # BLD AUTO: 5.59 X10*3/UL (ref 1.2–7.7)
NEUTROPHILS NFR BLD AUTO: 66.5 %
NRBC BLD-RTO: 0 /100 WBCS (ref 0–0)
PLATELET # BLD AUTO: 221 X10*3/UL (ref 150–450)
POTASSIUM SERPL-SCNC: 3.9 MMOL/L (ref 3.5–5.3)
RBC # BLD AUTO: 4.97 X10*6/UL (ref 4.5–5.9)
SODIUM SERPL-SCNC: 137 MMOL/L (ref 136–145)
WBC # BLD AUTO: 8.4 X10*3/UL (ref 4.4–11.3)

## 2025-07-14 PROCEDURE — 73560 X-RAY EXAM OF KNEE 1 OR 2: CPT | Mod: LEFT SIDE | Performed by: RADIOLOGY

## 2025-07-14 PROCEDURE — 80048 BASIC METABOLIC PNL TOTAL CA: CPT | Performed by: EMERGENCY MEDICINE

## 2025-07-14 PROCEDURE — 71260 CT THORAX DX C+: CPT

## 2025-07-14 PROCEDURE — 85025 COMPLETE CBC W/AUTO DIFF WBC: CPT | Performed by: EMERGENCY MEDICINE

## 2025-07-14 PROCEDURE — 72125 CT NECK SPINE W/O DYE: CPT

## 2025-07-14 PROCEDURE — 73560 X-RAY EXAM OF KNEE 1 OR 2: CPT | Mod: LT

## 2025-07-14 PROCEDURE — 36415 COLL VENOUS BLD VENIPUNCTURE: CPT | Performed by: EMERGENCY MEDICINE

## 2025-07-14 PROCEDURE — 70450 CT HEAD/BRAIN W/O DYE: CPT

## 2025-07-14 PROCEDURE — 99285 EMERGENCY DEPT VISIT HI MDM: CPT | Mod: 25 | Performed by: EMERGENCY MEDICINE

## 2025-07-14 PROCEDURE — 72125 CT NECK SPINE W/O DYE: CPT | Performed by: RADIOLOGY

## 2025-07-14 PROCEDURE — 70450 CT HEAD/BRAIN W/O DYE: CPT | Performed by: RADIOLOGY

## 2025-07-14 PROCEDURE — 2500000001 HC RX 250 WO HCPCS SELF ADMINISTERED DRUGS (ALT 637 FOR MEDICARE OP): Performed by: EMERGENCY MEDICINE

## 2025-07-14 PROCEDURE — 2550000001 HC RX 255 CONTRASTS: Mod: JW | Performed by: EMERGENCY MEDICINE

## 2025-07-14 RX ORDER — ACETAMINOPHEN 325 MG/1
975 TABLET ORAL ONCE
Status: COMPLETED | OUTPATIENT
Start: 2025-07-14 | End: 2025-07-14

## 2025-07-14 RX ADMIN — IOHEXOL 90 ML: 350 INJECTION, SOLUTION INTRAVENOUS at 22:24

## 2025-07-14 RX ADMIN — ACETAMINOPHEN 975 MG: 325 TABLET ORAL at 21:21

## 2025-07-14 ASSESSMENT — PAIN SCALES - GENERAL
PAINLEVEL_OUTOF10: 2
PAINLEVEL_OUTOF10: 4

## 2025-07-14 ASSESSMENT — PAIN - FUNCTIONAL ASSESSMENT: PAIN_FUNCTIONAL_ASSESSMENT: 0-10
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
## 2025-07-15 NOTE — DISCHARGE INSTRUCTIONS
Be aware that you may have increased discomfort tomorrow.  Please feel free to return to the emergency department with any additional concerns.